# Patient Record
Sex: MALE | URBAN - METROPOLITAN AREA
[De-identification: names, ages, dates, MRNs, and addresses within clinical notes are randomized per-mention and may not be internally consistent; named-entity substitution may affect disease eponyms.]

---

## 2023-08-11 ENCOUNTER — TELEPHONE (OUTPATIENT)
Dept: GASTROENTEROLOGY | Facility: CLINIC | Age: 88
End: 2023-08-11

## 2023-08-11 NOTE — TELEPHONE ENCOUNTER
Candelario with Dr. Padron notified unable to contact pt(no demographic). Candelario to fax pt's demographic to [phone].